# Patient Record
(demographics unavailable — no encounter records)

---

## 2025-03-17 NOTE — REVIEW OF SYSTEMS
Left knee pain was getting better with PT and steroid injection.  However, in the last month knee pain is resuming.  Patient plans on calling [facility] for follow-up appointment and consideration of another injection.   [Joint Pain] : joint pain [Joint Stiffness] : joint stiffness [Negative] : Heme/Lymph

## 2025-03-21 NOTE — HISTORY OF PRESENT ILLNESS
[de-identified] : Pt is here for an eval of her knees and left ankle. Reports pain in the knees. Worsening pain with stairs and at night. No n/t. Bilateral knees gel injections 6 months ago with pain relief. Completed in September with an outside Orthopedic. Lasted until a month ago. Left ankle pain is constant. Fell at a wedding 7 years ago which caused the pain. Occasionally tingling. Has gotten cortisone shots in the past in the ankle, most recent 4 months ago. Helped about a month. Takes Meloxicam with relief. Using cane.

## 2025-03-21 NOTE — PROCEDURE
[FreeTextEntry3] : Procedure Note: Musculoskeletal Injection Procedure :b/l  Knee Euflexxa #1   Indication:  The patient has had persistent pain despite conservative treatment.  Risks, benefits and alternatives to procedure were discussed; all questions were answered to the patient's apparent satisfaction and informed consent obtained.  The patient denied prior problems with local anesthetics, injectable cortisones, chicken allergy, coagulopathy and no relevant drug or preservative allergies or sensitivities.   The area of injection was prepared in a sterile fashion.  Prior to injection a 'Time Out' was conducted in accordance with [facility] policy and the site and nature of procedure verified with the patient.   Procedure: The procedure was carried out utilizing sterile technique from a **superolateral** arthroscopic portal position. [The needle was placed under ultrasound guidance to improve accuracy and minimize risk to the patient and diagnostic ultrasound in the long and short axis revealed evidence of joint space narrowing    Ultrasound Indication: precise intra-articular inj    0cc of clear synovial fluid was aspirated. The specimen: (X) appeared benign and was discarded ( ) was sent for Culture / Cell Count / Crystal analysis / [_].]     Injection into the target area with care taken to aspirate frequently to minimize the risk of intravascular injection was performed with:     (x) Euflexxa (20mg) () Orthovisc (30mg) ( ) Synvisc (16mg) ( ) GelOne (30mg) ( ) Durolane (60mg) ( ) Visco-3 (25mg) ( ) Synvisc-one (48mg) ( ) Supartz (25mg) ( ) Trivisc (25mg) ( ) Gel-Syn (16.8mg) ( ) Monovisc (88mg)   Patient tolerated the procedure well and direct pressure was applied for hemostasis. The patient was reminded of potential post-injection risks including, but not limited to, delayed hypersensitivity reactions and/or infection.  The patient verified that they had the office and the Emergency Room's contact information if any problems should arise.  After several minutes, the patient informed me that they felt fine and was released from the office.

## 2025-03-21 NOTE — PHYSICAL EXAM
[NL (40)] : plantar flexion 40 degrees [NL 30)] : inversion 30 degrees [NL (20)] : eversion 20 degrees [5___] : eversion 5[unfilled]/5 [2+] : posterior tibialis pulse: 2+ [Normal] : saphenous nerve sensation normal [2nd] : 2nd [] : no pain when stressing lateral tarsal metatarsal joint [Left] : left foot [Weight -] : weightbearing [FreeTextEntry3] : Bossing at NC joint.  [FreeTextEntry8] : ttp NC joint, maximal tenderness over 2nd TMT joint. [de-identified] :  AP, lateral and oblique xray views were taken and reviewed today. 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

## 2025-03-21 NOTE — HISTORY OF PRESENT ILLNESS
[de-identified] : Pt is here for an eval of her knees and left ankle. Reports pain in the knees. Worsening pain with stairs and at night. No n/t. Bilateral knees gel injections 6 months ago with pain relief. Completed in September with an outside Orthopedic. Lasted until a month ago. Left ankle pain is constant. Fell at a wedding 7 years ago which caused the pain. Occasionally tingling. Has gotten cortisone shots in the past in the ankle, most recent 4 months ago. Helped about a month. Takes Meloxicam with relief. Using cane.

## 2025-03-21 NOTE — DISCUSSION/SUMMARY
[Medication Risks Reviewed] : Medication risks reviewed [Surgical risks reviewed] : Surgical risks reviewed [de-identified] : x-ray left ankle today reveal degenerative changes  x-ray b/l knees today reveal degenerative changes b/l knees, joint space narrowing  Recommending patient consult with a foot/ankle specialist.  Discussed risks of surgical treatment and nonsurgical treatment of arthritis, discussed risks of steroid injection plus or minus visco supplementation, risks of zilretta and benefits, role of surgery and MRI, risks and role of NSAIDs and side effects, benefits of therapy. History of relief with visco injections in the past.   The risks, benefits and contents of the injection have been discussed. Risks include but are not limited to allergic reaction, flare reaction, permanent white skin discoloration at the injection site and infection.  The patient understands the risks and agrees to having the injection.  All questions have been answered.  Discussed risks of surgical treatment and nonsurgical treatment of arthritis, discussed risks of steroid injection plus or minus viscosupplementation, risks of zilretta and benefits, role of surgery and MRI, risks and role of NSAIDs and side effects, benefits of therapy. discussed total knee replacement and high morbidity at her age will prescribe mobic and discussed side effects  Discussed the timing of the injections and the follow up that is needed. Advised the patient to ice the area that was injected and that it may take a few days before experiencing relief.  Dsicussed starting a series of Euflexxa injections and pt would like to proceed with Euflexxa #1 b/l knees today.  Follow up in 1 week to continue series.   I, Santa Nguyen, attest that this documentation has been prepared under the direction and in the presence of Provider VENITA. Marie Castro

## 2025-03-21 NOTE — PROCEDURE
[FreeTextEntry3] : Procedure Note: Musculoskeletal Injection Procedure :b/l  Knee Euflexxa #1   Indication:  The patient has had persistent pain despite conservative treatment.  Risks, benefits and alternatives to procedure were discussed; all questions were answered to the patient's apparent satisfaction and informed consent obtained.  The patient denied prior problems with local anesthetics, injectable cortisones, chicken allergy, coagulopathy and no relevant drug or preservative allergies or sensitivities.   The area of injection was prepared in a sterile fashion.  Prior to injection a 'Time Out' was conducted in accordance with [facility] policy and the site and nature of procedure verified with the patient.   Procedure: The procedure was carried out utilizing sterile technique from a **superolateral** arthroscopic portal position. [The needle was placed under ultrasound guidance to improve accuracy and minimize risk to the patient and diagnostic ultrasound in the long and short axis revealed evidence of joint space narrowing    Ultrasound Indication: precise intra-articular inj    0cc of clear synovial fluid was aspirated. The specimen: (X) appeared benign and was discarded ( ) was sent for Culture / Cell Count / Crystal analysis / [_].]     Injection into the target area with care taken to aspirate frequently to minimize the risk of intravascular injection was performed with:     (x) Euflexxa (20mg) () Orthovisc (30mg) ( ) Synvisc (16mg) ( ) GelOne (30mg) ( ) Durolane (60mg) ( ) Visco-3 (25mg) ( ) Synvisc-one (48mg) ( ) Supartz (25mg) ( ) Trivisc (25mg) ( ) Gel-Syn (16.8mg) ( ) Monovisc (88mg)   Patient tolerated the procedure well and direct pressure was applied for hemostasis. The patient was reminded of potential post-injection risks including, but not limited to, delayed hypersensitivity reactions and/or infection.  The patient verified that they had the office and the Emergency Room's contact information if any problems should arise.  After several minutes, the patient informed me that they felt fine and was released from the office.

## 2025-03-21 NOTE — DISCUSSION/SUMMARY
[de-identified] :  Discussed treatment options with patient. WB in supportive footwear. Shoe modifications discussed. Oral vs. topical NSAIDS discussed. She typically takes Mobic 15 mg qday.  Ice to affected area.   The role of orthotics and a steroid injection discussed. She opts for both.

## 2025-03-21 NOTE — HISTORY OF PRESENT ILLNESS
[de-identified] : Pt is here for an eval of her knees and left ankle. Reports pain in the knees. Worsening pain with stairs and at night. No n/t. Bilateral knees gel injections 6 months ago with pain relief. Completed in September with an outside Orthopedic. Lasted until a month ago. Left ankle pain is constant. Fell at a wedding 7 years ago which caused the pain. Occasionally tingling. Has gotten cortisone shots in the past in the ankle, most recent 4 months ago. Helped about a month. Takes Meloxicam with relief. Using cane.

## 2025-03-21 NOTE — DISCUSSION/SUMMARY
[Medication Risks Reviewed] : Medication risks reviewed [Surgical risks reviewed] : Surgical risks reviewed [de-identified] : x-ray left ankle today reveal degenerative changes  x-ray b/l knees today reveal degenerative changes b/l knees, joint space narrowing  Recommending patient consult with a foot/ankle specialist.  Discussed risks of surgical treatment and nonsurgical treatment of arthritis, discussed risks of steroid injection plus or minus visco supplementation, risks of zilretta and benefits, role of surgery and MRI, risks and role of NSAIDs and side effects, benefits of therapy. History of relief with visco injections in the past.   The risks, benefits and contents of the injection have been discussed. Risks include but are not limited to allergic reaction, flare reaction, permanent white skin discoloration at the injection site and infection.  The patient understands the risks and agrees to having the injection.  All questions have been answered.  Discussed risks of surgical treatment and nonsurgical treatment of arthritis, discussed risks of steroid injection plus or minus viscosupplementation, risks of zilretta and benefits, role of surgery and MRI, risks and role of NSAIDs and side effects, benefits of therapy. discussed total knee replacement and high morbidity at her age will prescribe mobic and discussed side effects  Discussed the timing of the injections and the follow up that is needed. Advised the patient to ice the area that was injected and that it may take a few days before experiencing relief.  Dsicussed starting a series of Euflexxa injections and pt would like to proceed with Euflexxa #1 b/l knees today.  Follow up in 1 week to continue series.   I, Santa Nguyen, attest that this documentation has been prepared under the direction and in the presence of Provider VENITA. Marie Castro

## 2025-03-21 NOTE — HISTORY OF PRESENT ILLNESS
[de-identified] : Patient is an 82-year-old female complaining of left foot/ankle pain. Reports twisting injury while dancing at a wedding in 2019. She has had steroid injections with temporary relief, most recent around October 2024. WB in sneakers. She was evaluated at [facility] 3/17/25.  [FreeTextEntry1] : left ankle pain

## 2025-03-21 NOTE — PROCEDURE
[FreeTextEntry3] : The patient was offered a steroid injection to suppress acute inflammation.  The indication for the injection is persistent pain. The risks benefits, and alternatives have been discussed, and verbal consent was obtained.   The risks, benefits and contents of the injection have been discussed.  Risks include but are not limited to allergic reaction, flare reaction, permanent white skin discoloration at the injection site and infection.  The patient understands the risks and agrees to having the injection.  All questions have been answered.   An injection of the left 2nd tarsometatarsal joint was performed. The indication for this procedure was pain and inflammation. The site was prepped with alcohol and sterile technique used. With a 25 gauge needle, an injection of 1cc of Lidocaine 1% , 1cc of Ropivacaine  0.5% ,and 1cc of 80mg Methylprednisolone (Depomedrol) was performed. Patient tolerated procedure well. Patient was advised to call if redness, pain, or fever occur, apply ice for 15 minutes out of every hour for the next 12-24 hours as tolerated and patient was advised to rest the joint(s) for 3 days.   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 All ultrasound images have been permanently captured and stored accordingly in our picture archiving and communication system. Visualization of the needle and placement of injection was performed without complication.

## 2025-03-21 NOTE — DISCUSSION/SUMMARY
[Medication Risks Reviewed] : Medication risks reviewed [Surgical risks reviewed] : Surgical risks reviewed [de-identified] : x-ray left ankle today reveal degenerative changes  x-ray b/l knees today reveal degenerative changes b/l knees, joint space narrowing  Recommending patient consult with a foot/ankle specialist.  Discussed risks of surgical treatment and nonsurgical treatment of arthritis, discussed risks of steroid injection plus or minus visco supplementation, risks of zilretta and benefits, role of surgery and MRI, risks and role of NSAIDs and side effects, benefits of therapy. History of relief with visco injections in the past.   The risks, benefits and contents of the injection have been discussed. Risks include but are not limited to allergic reaction, flare reaction, permanent white skin discoloration at the injection site and infection.  The patient understands the risks and agrees to having the injection.  All questions have been answered.  Discussed risks of surgical treatment and nonsurgical treatment of arthritis, discussed risks of steroid injection plus or minus viscosupplementation, risks of zilretta and benefits, role of surgery and MRI, risks and role of NSAIDs and side effects, benefits of therapy. discussed total knee replacement and high morbidity at her age will prescribe mobic and discussed side effects  Discussed the timing of the injections and the follow up that is needed. Advised the patient to ice the area that was injected and that it may take a few days before experiencing relief.  Dsicussed starting a series of Euflexxa injections and pt would like to proceed with Euflexxa #1 b/l knees today.  Follow up in 1 week to continue series.   I, Santa Nguyen, attest that this documentation has been prepared under the direction and in the presence of Provider VENITA. Marie Castro

## 2025-03-26 NOTE — PROCEDURE
[FreeTextEntry3] : Procedure Note: Musculoskeletal Injection  Procedure: b/l knee, superolateral, Euflexxa # 2    Indication:  The patient has had persistent pain despite conservative treatment.  Risks, benefits and alternatives to procedure were discussed; all questions were answered to the patient's apparent satisfaction and informed consent obtained.  The patient denied prior problems with local anesthetics, injectable cortisones, chicken allergy, coagulopathy and no relevant drug or preservative allergies or sensitivities.   The area of injection was prepared in a sterile fashion.  Prior to injection a 'Time Out' was conducted in accordance with [facility] [employer] [facility] policy and the site and nature of procedure verified with the patient.   Procedure: The procedure was performed using sterile technique from a superolateral arthroscopic portal, with sterile gauze and Betadine utilized for skin preparation.   0cc of clear synovial fluid was aspirated. The specimen: (X) appeared benign and was discarded ( ) was sent for Culture / Cell Count / Crystal analysis / [_].]   Injection into the target area with care taken to aspirate frequently to minimize the risk of intravascular injection was performed with a sterile 18-gauge needle and (X) Euflexxa (20mg) ( ) Orthovisc (30mg) ( ) Synvisc (16mg) ( ) GelOne (30mg)   Patient tolerated the procedure well and direct pressure was applied for hemostasis. The patient was reminded of potential post-injection risks including, but not limited to, delayed hypersensitivity reactions and/or infection.  The patient verified that they had the office and the Emergency Room's contact information if any problems should arise.  After several minutes, the patient informed me that they felt fine and was released from the office.

## 2025-03-26 NOTE — PROCEDURE
[FreeTextEntry3] : Procedure Note: Musculoskeletal Injection  Procedure: b/l knee, superolateral, Euflexxa # 2    Indication:  The patient has had persistent pain despite conservative treatment.  Risks, benefits and alternatives to procedure were discussed; all questions were answered to the patient's apparent satisfaction and informed consent obtained.  The patient denied prior problems with local anesthetics, injectable cortisones, chicken allergy, coagulopathy and no relevant drug or preservative allergies or sensitivities.   The area of injection was prepared in a sterile fashion.  Prior to injection a 'Time Out' was conducted in accordance with [facility] policy and the site and nature of procedure verified with the patient.   Procedure: The procedure was performed using sterile technique from a superolateral arthroscopic portal, with sterile gauze and Betadine utilized for skin preparation.   0cc of clear synovial fluid was aspirated. The specimen: (X) appeared benign and was discarded ( ) was sent for Culture / Cell Count / Crystal analysis / [_].]   Injection into the target area with care taken to aspirate frequently to minimize the risk of intravascular injection was performed with a sterile 18-gauge needle and (X) Euflexxa (20mg) ( ) Orthovisc (30mg) ( ) Synvisc (16mg) ( ) GelOne (30mg)   Patient tolerated the procedure well and direct pressure was applied for hemostasis. The patient was reminded of potential post-injection risks including, but not limited to, delayed hypersensitivity reactions and/or infection.  The patient verified that they had the office and the Emergency Room's contact information if any problems should arise.  After several minutes, the patient informed me that they felt fine and was released from the office.

## 2025-03-26 NOTE — HISTORY OF PRESENT ILLNESS
[de-identified] : Patient is here for a follow up appointment for bilateral knees. Patient received Euflexxa injections at the previous visit on 3/17/25. still in pain

## 2025-03-26 NOTE — HISTORY OF PRESENT ILLNESS
[de-identified] : Patient is here for a follow up appointment for bilateral knees. Patient received Euflexxa injections at the previous visit on 3/17/25. still in pain

## 2025-03-26 NOTE — PHYSICAL EXAM
[Bilateral] : knee bilaterally [NL (0)] : extension 0 degrees [5___] : quadriceps 5[unfilled]/5 [] : non-antalgic [[id number]] : flexion 130 degrees

## 2025-03-26 NOTE — DISCUSSION/SUMMARY
[Medication Risks Reviewed] : Medication risks reviewed [Surgical risks reviewed] : Surgical risks reviewed [de-identified] : x-ray b/l knees 3/17/25 reveal degenerative changes b/l knees, joint space narrowing  Discussed risks of surgical treatment and nonsurgical treatment of arthritis, discussed risks of steroid injection plus or minus visco supplementation, risks of zilretta and benefits, role of surgery and MRI, risks and role of NSAIDs and side effects, benefits of therapy. History of relief with visco injections in the past.  reviewed bombaras notes and xrays The risks, benefits and contents of the injection have been discussed. Risks include but are not limited to allergic reaction, flare reaction, permanent white skin discoloration at the injection site and infection.  The patient understands the risks and agrees to having the injection.  All questions have been answered.  Discussed risks of surgical treatment and nonsurgical treatment of arthritis, discussed risks of steroid injection plus or minus viscosupplementation, risks of zilretta and benefits, role of surgery and MRI, risks and role of NSAIDs and side effects, benefits of therapy. discussed total knee replacement and high morbidity at her age she didnt start mobic , encouraged her to do so, we discussed knee replacement since she saw phyllis montoya Discussed the timing of the injections and the follow up that is needed. Advised the patient to ice the area that was injected and that it may take a few days before experiencing relief.  Dsicussed starting a series of Euflexxa injections and pt would like to proceed with Euflexxa #2 b/l knees today.

## 2025-04-03 NOTE — DISCUSSION/SUMMARY
[Medication Risks Reviewed] : Medication risks reviewed [Surgical risks reviewed] : Surgical risks reviewed [de-identified] :  This is an acute exacerbation of a chronic issue  Tests/Studies Independently Interpreted Today: Reviewed prev notes and imaging ------------------------------------------------------------------------------------------------------------------  Discussed risks of surgical treatment and nonsurgical treatment of arthritis, discussed risks of steroid injection plus or minus Visco supplementation, risks of Zilretta and benefits, role of surgery and MRI, risks and role of NSAIDs and side effects, benefits of therapy. The patient is aware and understands that if he fails all conservative treatment modalities, he should consider a total knee arthroplasty. In the interim, we will focus on conservative management of arthritic related pain.  discussed if injections not working we need to consider total knee replacement, I reviewed all of her notes today from Dr. Mata and has been doing injecdtions, relatively high risk for surgery given her age but not alot of other good options Pt would like to proceed with B/l knee Euflexxa today  Evaluated the patients B/L knee  and discussed treatment options in the form of injection therapy. Patient elected to receive B/L knee Euflexxa #3  Advised patient to rest and ice the area.   The risks, benefits and contents of the injection have been discussed. Risks include but are not limited to allergic reaction, flare reaction, permanent white skin discoloration at the injection site and infection.  The patient understands the risks and agrees to having the injection.  All questions have been answered.   prescribed mobic but explained she has to be careful with it secondary to ibs performed independent evaluation today as normally sees cheo and needed to review records to get up to speed on treatment and plan which was discussed with her   I, Magy De Paz, attest that this documentation has been prepared under the direction and in the presence of Provider MARCELO Castro

## 2025-04-03 NOTE — PROCEDURE
Talon Lawrence is a 79 y.o. Male who came into the clinic today for recheck and for appropriate management. The   patient was last seen by me on 8/22/2022. The patient informs me that he has been taking his medications as   prescribed and has not noticed any side effects from the same. The patient was recently admitted to the [facility] on 12/15/2022 and was discharged on 12/19/2022 when he was found to have influenza A, UTI and   exacerbation of CHF. The patient informs me that he feels much better as compared to when he was in the   hospital.  Otherwise today he did not have any other questions or concerns and all the question and concerns   were appropriately answered.     I reviewed and discussed below mentioned labs with the patient today:    Lab Results   Component Value Date/Time    WBC 7.5 12/19/2022 06:05 AM    RBC 5.11 12/19/2022 06:05 AM    MCV 86.7 12/19/2022 06:05 AM    MCHC 33.9 12/19/2022 06:05 AM     Lab Results   Component Value Date/Time    ANIONGAP 14 12/19/2022 06:06 AM    GLUCOSE 145 12/19/2022 06:06 AM    BUN 40 12/19/2022 06:06 AM    CREATININE 1.3 12/19/2022 06:06 AM    AGRATIO 0.9 12/19/2022 06:06 AM    CALCIUM 9.1 12/19/2022 06:06 AM     Past Medical History:   Diagnosis Date    A-fib ([facility] [address] 2020    CAD, multiple vessel 12/2019    Coronary artery disease involving coronary bypass graft     Diabetes mellitus ([facility] [address]     diet controlled     Enlarged prostate     Environmental allergies     Kidney stones     Pacemaker     Systolic CHF [facility])        Patient Active Problem List   Diagnosis    Dilated cardiomyopathy ([facility] [address]    Paroxysmal atrial fibrillation (HCC)    S/P coronary angiogram    Monilial cystitis    Urinary tract obstruction    CAD, multiple vessel    ICD (implantable cardioverter-defibrillator) in place-[employer]    Ischemic cardiomyopathy    AICD (automatic cardioverter/defibrillator) present    Mixed hyperlipidemia    Gastroesophageal reflux disease without esophagitis    Uncontrolled type 2 diabetes mellitus with hyperglycemia (HCC)    Acute kidney injury (NANETTE) with acute tubular necrosis (ATN) (HCC)    Primary hypertension    Chronic systolic (congestive) heart failure (HCC)       Past Surgical History:   Procedure Laterality Date    CARDIAC CATHETERIZATION  12/10/2019    Dr. Dionicio Pimentel - severe multi-vessel disease    CORONARY ARTERY BYPASS GRAFT N/A 3/2/2020    WAYNE; TCPB; CABG x 3, LIMA to LAD w/ long segment endarterectomy (4 cm); bilateral pulmonary vein isolation; 40 mm Atriclip to L atril appendage; endoscopic L GSV harvest; ICNB x 5 levels bilat; sternal plate x 1 to manubrium performed by Charles Romano MD at [address]  x2    NASAL SEPTUM SURGERY         Family History   Problem Relation Age of Onset    Dementia Mother         Vascular    Alzheimer's Disease Mother     Heart Attack Father     Diabetes Paternal Aunt        Social History     Tobacco Use    Smoking status: Never    Smokeless tobacco: Never   Substance Use Topics    Alcohol use: Not Currently     Comment: rare       Current Outpatient Medications on File Prior to Visit   Medication Sig Dispense Refill    albuterol (PROVENTIL) (2.5 MG/3ML) 0.083% nebulizer solution Take 3 mLs by nebulization every 4 hours as needed for Wheezing 120 each 0    furosemide (LASIX) 80 MG tablet Take 1 tablet by mouth in the morning and 1 tablet in the evening.  180 tablet 0    potassium chloride (KLOR-CON M) 10 MEQ extended release tablet Take 1 tablet by mouth 2 times daily 60 tablet 0    rosuvastatin (CRESTOR) 20 MG tablet Take 1 tablet by mouth daily 90 tablet 3    EPINEPHrine (PRIMATENE MIST) 0.125 MG/ACT AERO Inhale 2 puffs into the lungs once      metoprolol tartrate (LOPRESSOR) 25 MG tablet TAKE 1 TABLET BY MOUTH TWICE DAILY      aspirin 81 MG chewable tablet Take 1 tablet by mouth daily 30 tablet 3    apixaban (ELIQUIS) 5 MG TABS tablet Take 1 tablet by mouth 2 times daily 60 tablet 5 Handicap Placard MISC by Does not apply route Pt cannot walk more that 250 feet without becoming SOB. Dx: CAD, Cardiomyopathy, Atrial Fibrilation    Not to exceed 5 years (Patient taking differently: by Does not apply route Pt cannot walk more that 250 feet without becoming SOB. Dx: CAD, Cardiomyopathy, Atrial Fibrilation    Not to exceed 5 years) 1 each 0    amiodarone (CORDARONE) 200 MG tablet Take 1 tablet by mouth daily 30 tablet 0    [DISCONTINUED] famotidine (PEPCID) 20 MG tablet Take 1 tablet by mouth daily (Patient not taking: Reported on 8/22/2022) 60 tablet 3    [DISCONTINUED] insulin lispro, 1 Unit Dial, 100 UNIT/ML SOPN Inject 3 Units into the skin 3 times daily (with meals) (Patient not taking: No sig reported) 1 pen 0     No current facility-administered medications on file prior to visit. Allergies   Allergen Reactions    Codeine Other (See Comments)     Seizures    Aspartame And Phenylalanine      REVIEW OF SYSTEMS:   CONSTITUTIONAL: No weight loss, fever, chills. Complains of weakness or fatigue. HEENT: Eyes: No visual loss, blurred vision, double vision or yellow sclerae. Ears, Nose, Throat: No hearing loss, sneezing, congestion, runny nose or sore throat. SKIN: No rash or itching. CARDIOVASCULAR: No chest pain, chest pressure or chest discomfort. No  palpitations. Complains of edema. RESPIRATORY: No shortness of breath, cough or sputum. GASTROINTESTINAL: No anorexia, nausea, vomiting, diarrhea or constipation. No abdominal pain, hematochezia or melena. GERD. GENITOURINARY:No dysuria, urgency, frequency, hematuria. NEUROLOGICAL: No headache, dizziness, syncope, paralysis, ataxia,   numbness or tingling in the extremities. No change in bowel or bladder control. MUSCULOSKELETAL: No muscle pain, back pain, joint pain or stiffness. PSYCHIATRIC: No history of depression or anxiety. ENDOCRINOLOGIC: No reports of sweating, cold or heat intolerance. No polyuria or polydipsia. Objective     . /74   Pulse 94   Temp 97.8 °F (36.6 °C)   Ht 6' 1\" (1.854 m)   Wt 264 lb (119.7 kg)   SpO2 98%   BMI 34.83 kg/m²     GENERAL:  Mary Ortez is a 79 y.o.  male who is not in any acute distress. He was well attired and well groomed and was speaking in full sentences. LUNGS:  Normal ventilatory breath sounds are heard bilaterally. No crackles   or wheezes heard. CARDIOVASCULAR:  Normal S1, S2 heard. No murmurs heard. No JVD. ICD   felt to below the left clavicle     EXTREMITIES:  All 4 extremities were moving fine. Full range of motion is   present. No deformity noted. Peripheral pulses were felt. Bilateral trace lower   extremity edema. Neurovascular integrity maintained. NEUROLOGICAL:  The patient was alert, conscious, and cooperative. Oriented   to time, place, and person. Muscle power in all 4 limbs is 5/5. Cranial   nerves II thru XII are grossly intact. No sensory or motor loss. Assessment/Plan     Diagnoses and all orders for this visit:    Uncontrolled type 2 diabetes mellitus with hyperglycemia ([facility] [address]    Primary hypertension    Chronic systolic (congestive) heart failure (HCC)    Paroxysmal atrial fibrillation [facility])    Hospital discharge follow-up  -     [place] DISCHARGE MEDS RECONCILED W/ CURRENT OUTPATIENT MED LIST      Advise given:  - Take all prescription medication as prescribed. - Take precaution during ambulation.  - Eat five servings of fruits and vegetables everyday. - Discussed importance of regular exercise and recommended starting or continuing a regular exercise program for good health. - Try to lose weight with diet and exercise as discussed. - The importance of monitoring blood sugar regularly was reviewed. - The importance of proper foot care and regularly checking feet to prevent sores and possibly loss of limbs was reviewed.    - The importance of keeping the blood pressure monitoring to prevent stroke, heart attacks, kidney failure, blindness, and loss of limbs was reviewed. - Appropriate handout were given to the patient. -  All the questions and concerns were appropriately answered. - Patient / family member / caregiver verbalized understanding of patient instructions from today's visit. - The patient was advised to follow up with me in 6 months for recheck or can call me before if has any other questions or concerns. [FreeTextEntry3] : EUFLEXXA INJECTION PROCEDURE NOTE:   Procedure Note: Musculoskeletal Injection Procedure: B/L knee Euflexxa #3   Indication:  The patient has had persistent pain despite conservative treatment.  Risks, benefits and alternatives to procedure were discussed; all questions were answered to the patient's apparent satisfaction and informed consent obtained.  The patient denied prior problems with local anesthetics, injectable cortisones, chicken allergy, coagulopathy and no relevant drug or preservative allergies or sensitivities.   The area of injection was prepared in a sterile fashion.  Prior to injection a 'Time Out' was conducted in accordance with [employer] policy and the site and nature of procedure verified with the patient.   Procedure: The procedure was preformed using sterile technique from a superolateral arthroscopic portal with sterile gauze and betadine utilized for skin preparation. The needle was placed under ultrasound guidance to improve accuracy and minimize risk to the patient. Diagnostic ultrasound images, subsequently saved for the patient's record, were taken in the long and short axis and revealed evidence of osteoarthritis      Ultrasound Indication: prior failure/difiicult to inj     0cc of clear synovial fluid was aspirated. The specimen: (X) appeared benign and was discarded ( ) was sent for Culture / Cell Count / Crystal analysis / [_].]     Injection into the target area with care taken to aspirate frequently to minimize the risk of intravascular injection was performed with a sterile 18-gauge needle and:     (x) Euflexxa (20mg) R knee (x) Euflexxa (20mg)L knee () Orthovisc (30mg) ( ) Synvisc (16mg) ( ) GelOne (30mg) ( ) Durolane (60mg) ( ) Visco-3 (25mg) ( ) Synvisc-one (48mg) ( ) Supartz (25mg) ( ) Trivisc (25mg) ( ) Gel-Syn (16.8mg) ( ) Monovisc (88mg)   Patient tolerated the procedure well and direct pressure was applied for hemostasis. The patient was reminded of potential post-injection risks including, but not limited to, delayed hypersensitivity reactions and/or infection.  The patient verified that they had the office and the Emergency Room's contact information if any problems should arise.  After several minutes, the patient informed me that they felt fine and was released from the office.

## 2025-04-03 NOTE — HISTORY OF PRESENT ILLNESS
Pt c/o nausea and feeling bloated. Suppository given this am but unable to pass flatus or have bm. Zofran given for nausea. Dr. Harsh Tapia paged with update. 1717: Dr. Harsh Tapia updated on above, plan for possible discharge tomorrow if patient feeling better. [de-identified] : Patient is here for a follow up appointment for bilateral knees. Patient received Euflexxa injections at the previous visit on 3/26/25. still in pain

## 2025-04-03 NOTE — PROCEDURE
[FreeTextEntry3] : EUFLEXXA INJECTION PROCEDURE NOTE:   Procedure Note: Musculoskeletal Injection Procedure: B/L knee Euflexxa #3   Indication:  The patient has had persistent pain despite conservative treatment.  Risks, benefits and alternatives to procedure were discussed; all questions were answered to the patient's apparent satisfaction and informed consent obtained.  The patient denied prior problems with local anesthetics, injectable cortisones, chicken allergy, coagulopathy and no relevant drug or preservative allergies or sensitivities.   The area of injection was prepared in a sterile fashion.  Prior to injection a 'Time Out' was conducted in accordance with [employer] policy and the site and nature of procedure verified with the patient.   Procedure: The procedure was preformed using sterile technique from a superolateral arthroscopic portal with sterile gauze and betadine utilized for skin preparation. The needle was placed under ultrasound guidance to improve accuracy and minimize risk to the patient. Diagnostic ultrasound images, subsequently saved for the patient's record, were taken in the long and short axis and revealed evidence of osteoarthritis      Ultrasound Indication: prior failure/difiicult to inj     0cc of clear synovial fluid was aspirated. The specimen: (X) appeared benign and was discarded ( ) was sent for Culture / Cell Count / Crystal analysis / [_].]     Injection into the target area with care taken to aspirate frequently to minimize the risk of intravascular injection was performed with a sterile 18-gauge needle and:     (x) Euflexxa (20mg) R knee (x) Euflexxa (20mg)L knee () Orthovisc (30mg) ( ) Synvisc (16mg) ( ) GelOne (30mg) ( ) Durolane (60mg) ( ) Visco-3 (25mg) ( ) Synvisc-one (48mg) ( ) Supartz (25mg) ( ) Trivisc (25mg) ( ) Gel-Syn (16.8mg) ( ) Monovisc (88mg)   Patient tolerated the procedure well and direct pressure was applied for hemostasis. The patient was reminded of potential post-injection risks including, but not limited to, delayed hypersensitivity reactions and/or infection.  The patient verified that they had the office and the Emergency Room's contact information if any problems should arise.  After several minutes, the patient informed me that they felt fine and was released from the office.

## 2025-04-03 NOTE — PHYSICAL EXAM
[Bilateral] : knee bilaterally [NL (0)] : extension 0 degrees [] : minimal effusion [TWNoteComboBox7] : flexion 125 degrees

## 2025-04-03 NOTE — HISTORY OF PRESENT ILLNESS
[de-identified] : Patient is here for a follow up appointment for bilateral knees. Patient received Euflexxa injections at the previous visit on 3/26/25. still in pain

## 2025-04-03 NOTE — DISCUSSION/SUMMARY
[Medication Risks Reviewed] : Medication risks reviewed [Surgical risks reviewed] : Surgical risks reviewed [de-identified] :  This is an acute exacerbation of a chronic issue  Tests/Studies Independently Interpreted Today: Reviewed prev notes and imaging ------------------------------------------------------------------------------------------------------------------  Discussed risks of surgical treatment and nonsurgical treatment of arthritis, discussed risks of steroid injection plus or minus Visco supplementation, risks of Zilretta and benefits, role of surgery and MRI, risks and role of NSAIDs and side effects, benefits of therapy. The patient is aware and understands that if he fails all conservative treatment modalities, he should consider a total knee arthroplasty. In the interim, we will focus on conservative management of arthritic related pain.  discussed if injections not working we need to consider total knee replacement, I reviewed all of her notes today from Dr. Mata and has been doing injecdtions, relatively high risk for surgery given her age but not alot of other good options Pt would like to proceed with B/l knee Euflexxa today  Evaluated the patients B/L knee  and discussed treatment options in the form of injection therapy. Patient elected to receive B/L knee Euflexxa #3  Advised patient to rest and ice the area.   The risks, benefits and contents of the injection have been discussed. Risks include but are not limited to allergic reaction, flare reaction, permanent white skin discoloration at the injection site and infection.  The patient understands the risks and agrees to having the injection.  All questions have been answered.   prescribed mobic but explained she has to be careful with it secondary to ibs performed independent evaluation today as normally sees cheo and needed to review records to get up to speed on treatment and plan which was discussed with her   I, Magy De Paz, attest that this documentation has been prepared under the direction and in the presence of Provider MARCELO Castro

## 2025-04-03 NOTE — HISTORY OF PRESENT ILLNESS
[de-identified] : Patient is here for a follow up appointment for bilateral knees. Patient received Euflexxa injections at the previous visit on 3/26/25. still in pain

## 2025-04-03 NOTE — DISCUSSION/SUMMARY
[Medication Risks Reviewed] : Medication risks reviewed [Surgical risks reviewed] : Surgical risks reviewed [de-identified] :  This is an acute exacerbation of a chronic issue  Tests/Studies Independently Interpreted Today: Reviewed prev notes and imaging ------------------------------------------------------------------------------------------------------------------  Discussed risks of surgical treatment and nonsurgical treatment of arthritis, discussed risks of steroid injection plus or minus Visco supplementation, risks of Zilretta and benefits, role of surgery and MRI, risks and role of NSAIDs and side effects, benefits of therapy. The patient is aware and understands that if he fails all conservative treatment modalities, he should consider a total knee arthroplasty. In the interim, we will focus on conservative management of arthritic related pain.  discussed if injections not working we need to consider total knee replacement, I reviewed all of her notes today from Dr. Mata and has been doing injecdtions, relatively high risk for surgery given her age but not alot of other good options Pt would like to proceed with B/l knee Euflexxa today  Evaluated the patients B/L knee  and discussed treatment options in the form of injection therapy. Patient elected to receive B/L knee Euflexxa #3  Advised patient to rest and ice the area.   The risks, benefits and contents of the injection have been discussed. Risks include but are not limited to allergic reaction, flare reaction, permanent white skin discoloration at the injection site and infection.  The patient understands the risks and agrees to having the injection.  All questions have been answered.   prescribed mobic but explained she has to be careful with it secondary to ibs performed independent evaluation today as normally sees cheo and needed to review records to get up to speed on treatment and plan which was discussed with her   I, Magy De Paz, attest that this documentation has been prepared under the direction and in the presence of Provider MARCELO Castro

## 2025-04-03 NOTE — PROCEDURE
[FreeTextEntry3] : EUFLEXXA INJECTION PROCEDURE NOTE:   Procedure Note: Musculoskeletal Injection Procedure: B/L knee Euflexxa #3   Indication:  The patient has had persistent pain despite conservative treatment.  Risks, benefits and alternatives to procedure were discussed; all questions were answered to the patient's apparent satisfaction and informed consent obtained.  The patient denied prior problems with local anesthetics, injectable cortisones, chicken allergy, coagulopathy and no relevant drug or preservative allergies or sensitivities.   The area of injection was prepared in a sterile fashion.  Prior to injection a 'Time Out' was conducted in accordance with [employer] policy and the site and nature of procedure verified with the patient.   Procedure: The procedure was preformed using sterile technique from a superolateral arthroscopic portal with sterile gauze and betadine utilized for skin preparation. The needle was placed under ultrasound guidance to improve accuracy and minimize risk to the patient. Diagnostic ultrasound images, subsequently saved for the patient's record, were taken in the long and short axis and revealed evidence of osteoarthritis      Ultrasound Indication: prior failure/difiicult to inj     0cc of clear synovial fluid was aspirated. The specimen: (X) appeared benign and was discarded ( ) was sent for Culture / Cell Count / Crystal analysis / [_].]     Injection into the target area with care taken to aspirate frequently to minimize the risk of intravascular injection was performed with a sterile 18-gauge needle and:     (x) Euflexxa (20mg) R knee (x) Euflexxa (20mg)L knee () Orthovisc (30mg) ( ) Synvisc (16mg) ( ) GelOne (30mg) ( ) Durolane (60mg) ( ) Visco-3 (25mg) ( ) Synvisc-one (48mg) ( ) Supartz (25mg) ( ) Trivisc (25mg) ( ) Gel-Syn (16.8mg) ( ) Monovisc (88mg)   Patient tolerated the procedure well and direct pressure was applied for hemostasis. The patient was reminded of potential post-injection risks including, but not limited to, delayed hypersensitivity reactions and/or infection.  The patient verified that they had the office and the Emergency Room's contact information if any problems should arise.  After several minutes, the patient informed me that they felt fine and was released from the office.

## 2025-05-02 NOTE — PHYSICAL EXAM
[NL (40)] : plantar flexion 40 degrees [5___] : eversion 5[unfilled]/5 [2+] : posterior tibialis pulse: 2+ [Normal] : saphenous nerve sensation normal [Left] : left foot [Weight -] : weightbearing [] : non-antalgic [FreeTextEntry3] : Bossing at NC joint.  [de-identified] :  AP, lateral and oblique xray views were taken and reviewed today. 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  [TWNoteComboBox7] : dorsiflexion 15 degrees [de-identified] : eversion 15 degrees [de-identified] : inversion 15 degrees

## 2025-05-02 NOTE — HISTORY OF PRESENT ILLNESS
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  She had a 2nd TMT steroid injection on 3/21/25 which was helpful, but the pain has returned. She is wb in supportive sneakers. [FreeTextEntry1] : left ankle pain  No

## 2025-05-02 NOTE — DISCUSSION/SUMMARY
[de-identified] : Patient was told that it is too early for another injection.  Consistent use of meloxicam was recommended. Custom orthotics was again recommended, but she declined. Icing recommended. She will start PT for her ankle stiffness.  Physical therapy was prescribed for 2-3 times per week for four weeks.

## 2025-07-07 NOTE — HISTORY OF PRESENT ILLNESS
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  She had a 2nd TMT steroid injection on 3/21/25 which was helpful.  Since last visit she did not go to PT.  She reports having pain again. She is wb in sneakers with a cane. [FreeTextEntry1] : left ankle pain

## 2025-07-07 NOTE — PROCEDURE
[FreeTextEntry3] : The patient was offered a steroid injection to suppress acute inflammation. The indication for the injection is persistent pain. The risks benefits, and alternatives have been discussed, and verbal consent was obtained.   The risks, benefits and contents of the injection have been discussed.  Risks include but are not limited to allergic reaction, flare reaction, permanent white skin discoloration at the injection site and infection.  The patient understands the risks and agrees to having the injection.  All questions have been answered.   An injection of the left 2nd TMT joint was performed. The indication for this procedure was pain and inflammation. The site was prepped with alcohol and sterile technique used. With a 25 gauge needle, an injection of 1cc Lidocaine 1% , 1cc of Ropivacaine 0.5% ,and 1cc of 80mg Methylprednisolone (Depomedrol)  was performed. Patient tolerated procedure well. Patient was advised to call if redness, pain, or fever occur, apply ice for 15 minutes out of every hour for the next 12-24 hours as tolerated and patient was advised to rest the joint(s) for 3 days.us

## 2025-07-07 NOTE — PHYSICAL EXAM
[NL (40)] : plantar flexion 40 degrees [5___] : eversion 5[unfilled]/5 [2+] : posterior tibialis pulse: 2+ [Normal] : saphenous nerve sensation normal [Left] : left foot [Weight -] : weightbearing [de-identified] :  AP, lateral and oblique xray views were taken and reviewed today. 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  [NL 30)] : inversion 30 degrees [] : non-antalgic [FreeTextEntry3] : Bossing at NC joint.  [TWNoteComboBox7] : dorsiflexion 15 degrees [de-identified] : eversion 15 degrees

## 2025-07-07 NOTE — DISCUSSION/SUMMARY
[de-identified] : Patient was offered another injection and would like proceed. Consistent use of meloxicam was recommended. New rx given today. Custom orthotics was again recommended, and she would like to obtain them. Please let this serve as a letter of medical necessity for custom orthotics to support foot and reduce symptoms associated with arthritis.  Continue with PT for her ankle stiffness.  Physical therapy was prescribed for 2-3 times per week for four weeks.